# Patient Record
Sex: MALE | Race: WHITE | NOT HISPANIC OR LATINO | Employment: UNEMPLOYED | URBAN - METROPOLITAN AREA
[De-identification: names, ages, dates, MRNs, and addresses within clinical notes are randomized per-mention and may not be internally consistent; named-entity substitution may affect disease eponyms.]

---

## 2021-01-13 ENCOUNTER — OFFICE VISIT (OUTPATIENT)
Dept: FAMILY MEDICINE | Facility: CLINIC | Age: 21
End: 2021-01-13
Payer: COMMERCIAL

## 2021-01-13 VITALS
BODY MASS INDEX: 21.05 KG/M2 | WEIGHT: 164 LBS | SYSTOLIC BLOOD PRESSURE: 130 MMHG | HEIGHT: 74 IN | OXYGEN SATURATION: 99 % | TEMPERATURE: 96.9 F | DIASTOLIC BLOOD PRESSURE: 60 MMHG | RESPIRATION RATE: 20 BRPM | HEART RATE: 69 BPM

## 2021-01-13 DIAGNOSIS — F90.2 ATTENTION DEFICIT HYPERACTIVITY DISORDER (ADHD), COMBINED TYPE: ICD-10-CM

## 2021-01-13 DIAGNOSIS — Q67.6 PECTUS EXCAVATUM: ICD-10-CM

## 2021-01-13 DIAGNOSIS — Z00.00 ROUTINE GENERAL MEDICAL EXAMINATION AT A HEALTH CARE FACILITY: Primary | ICD-10-CM

## 2021-01-13 PROCEDURE — 90471 IMMUNIZATION ADMIN: CPT | Performed by: FAMILY MEDICINE

## 2021-01-13 PROCEDURE — 90715 TDAP VACCINE 7 YRS/> IM: CPT | Performed by: FAMILY MEDICINE

## 2021-01-13 PROCEDURE — 99385 PREV VISIT NEW AGE 18-39: CPT | Mod: 25 | Performed by: FAMILY MEDICINE

## 2021-01-13 PROCEDURE — 99213 OFFICE O/P EST LOW 20 MIN: CPT | Mod: 25 | Performed by: FAMILY MEDICINE

## 2021-01-13 RX ORDER — DEXTROAMPHETAMINE SACCHARATE, AMPHETAMINE ASPARTATE, DEXTROAMPHETAMINE SULFATE AND AMPHETAMINE SULFATE 3.75; 3.75; 3.75; 3.75 MG/1; MG/1; MG/1; MG/1
TABLET ORAL
COMMUNITY
Start: 2020-11-13 | End: 2021-01-13

## 2021-01-13 RX ORDER — DEXTROAMPHETAMINE SACCHARATE, AMPHETAMINE ASPARTATE, DEXTROAMPHETAMINE SULFATE AND AMPHETAMINE SULFATE 3.75; 3.75; 3.75; 3.75 MG/1; MG/1; MG/1; MG/1
TABLET ORAL
Qty: 45 TABLET | Refills: 0 | Status: SHIPPED | OUTPATIENT
Start: 2021-01-13 | End: 2021-03-01

## 2021-01-13 ASSESSMENT — PATIENT HEALTH QUESTIONNAIRE - PHQ9
SUM OF ALL RESPONSES TO PHQ QUESTIONS 1-9: 12
10. IF YOU CHECKED OFF ANY PROBLEMS, HOW DIFFICULT HAVE THESE PROBLEMS MADE IT FOR YOU TO DO YOUR WORK, TAKE CARE OF THINGS AT HOME, OR GET ALONG WITH OTHER PEOPLE: VERY DIFFICULT
SUM OF ALL RESPONSES TO PHQ QUESTIONS 1-9: 12

## 2021-01-13 ASSESSMENT — MIFFLIN-ST. JEOR: SCORE: 1823.65

## 2021-01-13 NOTE — PROGRESS NOTES
SUBJECTIVE:   CC: Keanu Vu is an 20 year old male who presents for preventative health visit.     Patient has been advised of split billing requirements and indicates understanding: Yes  Healthy Habits:     Getting at least 3 servings of Calcium per day:  NO    Bi-annual eye exam:  NO    Dental care twice a year:  Yes    Sleep apnea or symptoms of sleep apnea:  None    Diet:  Regular (no restrictions)    Frequency of exercise:  2-3 days/week    Duration of exercise:  15-30 minutes    Taking medications regularly:  Yes    Medication side effects:  Other    PHQ-2 Total Score: 4    Additional concerns today:  Yes    Pt requesting adderall refill - previously seen at [facility] signed. Pt would also like to discuss possible medications for depression/anxiety.     He has a medical history significant for ADHD.  He reports being on medication for this since third-fourth grade.  Most recently he has been on Adderall 15 mg in the morning and 7.5 mg in the afternoon.  This was being prescribed through a provider affiliated with [facility] where he is in [facility].  He did not enroll this past fall and has not been able to see this provider.  He is debating whether or not he is going to enroll this spring.  He has a few days left to decide.  He has been spacing out his Adderall use, but ran out 2 days ago.  He has increased hyperactivity as well as difficulty with focus, task completion and distractibility when he is not on the medication.  He has been working for [employer].  He denies significant unwanted side effects, but the Adderall does affect sleep and appetite.  He also has a history of pectus excavatum and scoliosis.  He reports seeing a specialist for this when he was in high school and they did not recommend any specific treatments.  He denies having pain or problems with these issues.    Answers for HPI/ROS submitted by the patient on 1/13/2021   Annual Exam:  If you checked off any  problems, how difficult have these problems made it for you to do your work, take care of things at home, or get along with other people?: Very difficult  PHQ9 TOTAL SCORE: 12              Today's PHQ-2 Score:   PHQ-2 ( 1999 [employer]) 1/13/2021   Q1: Little interest or pleasure in doing things 3   Q2: Feeling down, depressed or hopeless 1   PHQ-2 Score 4   Q1: Little interest or pleasure in doing things Nearly every day   Q2: Feeling down, depressed or hopeless Several days   PHQ-2 Score 4       Abuse: Current or Past(Physical, Sexual or Emotional)- No  Do you feel safe in your environment? Yes    Social History     Tobacco Use     Smoking status: Never Smoker     Smokeless tobacco: Current User   Substance Use Topics     Alcohol use: Not Currently     If you drink alcohol do you typically have >3 drinks per day or >7 drinks per week? No    Alcohol Use 1/13/2021   Prescreen: >3 drinks/day or >7 drinks/week? No   Prescreen: >3 drinks/day or >7 drinks/week? -   No flowsheet data found.    Last PSA: No results found for: PSA    Reviewed orders with patient. Reviewed health maintenance and updated orders accordingly - Yes      Reviewed and updated as needed this visit by clinical staff  Tobacco  Allergies  Meds   Med Hx  Surg Hx  Fam Hx  Soc Hx        Reviewed and updated as needed this visit by Provider                Past Surgical History:   Procedure Laterality Date     HC TOOTH EXTRACTION W/FORCEP         Review of Systems  CONSTITUTIONAL: NEGATIVE for fever, chills, change in weight  INTEGUMENTARY/SKIN: NEGATIVE for worrisome rashes, moles or lesions  EYES: NEGATIVE for vision changes or irritation  ENT: NEGATIVE for ear, mouth and throat problems  RESP: NEGATIVE for significant cough or SOB  CV: NEGATIVE for chest pain, palpitations or peripheral edema  GI: NEGATIVE for nausea, abdominal pain, heartburn, or change in bowel habits   male: negative for dysuria, hematuria, decreased urinary stream, erectile  "dysfunction, urethral discharge  MUSCULOSKELETAL: NEGATIVE for significant arthralgias or myalgia  NEURO: NEGATIVE for weakness, dizziness or paresthesias  PSYCHIATRIC: NEGATIVE for changes in mood or affect    OBJECTIVE:   /60 (Patient Position: Sitting, Cuff Size: Adult Regular)   Pulse 69   Temp 96.9  F (36.1  C) (Temporal)   Resp 20   Ht 1.88 m (6' 2\")   Wt 74.4 kg (164 lb)   SpO2 99%   BMI 21.06 kg/m      Physical Exam  GENERAL: healthy, alert and no distress  EYES: Eyes grossly normal to inspection, PERRL and conjunctivae and sclerae normal  HENT: ear canals and TM's normal, nose and mouth without ulcers or lesions  NECK: no adenopathy, no asymmetry, masses, or scars and thyroid normal to palpation  RESP: lungs clear to auscultation - no rales, rhonchi or wheezes  CV: regular rate and rhythm, normal S1 S2, no S3 or S4, no murmur, click or rub, no peripheral edema and peripheral pulses strong  ABDOMEN: soft, nontender, no hepatosplenomegaly, no masses and bowel sounds normal   (male): normal male genitalia without lesions or urethral discharge, no hernia  MS: no gross musculoskeletal defects noted, no edema  SKIN: no suspicious lesions or rashes  NEURO: Normal strength and tone, mentation intact and speech normal  PSYCH: mentation appears normal, affect normal/bright    Diagnostic Test Results:  Labs reviewed in Epic    ASSESSMENT/PLAN:   1. Routine general medical examination at a health care facility  I encouraged him to get regular exercise and eat a healthy diet.  He is not fasting today, therefore we decided not to check labs.  I offered STD screening labs which he declined today.  He was given a tetanus vaccination today.    2. Attention deficit hyperactivity disorder (ADHD), combined type  We are attempting to get records from his provider through the [facility] where he was being prescribed Adderall.  I went ahead and provided a 1 month refill while we are waiting for the " "records.  He has a long history of ADHD which has been controlled with Adderall without significant side effects.  He may continue to follow-up with me for this or with the previous provider.  I explained that he will need to pick 1 provider to follow him for refills.  - amphetamine-dextroamphetamine (ADDERALL) 15 MG tablet; take 1 TABLET by mouth EVERY MORNING AND 1/2 TABLET EVERY AFTERNOON  Dispense: 45 tablet; Refill: 0    3. Pectus excavatum  No work-up is needed for this as he is asymptomatic.      Patient has been advised of split billing requirements and indicates understanding: Yes  COUNSELING:   Reviewed preventive health counseling, as reflected in patient instructions       Regular exercise       Healthy diet/nutrition    Estimated body mass index is 21.06 kg/m  as calculated from the following:    Height as of this encounter: 1.88 m (6' 2\").    Weight as of this encounter: 74.4 kg (164 lb).         He reports that he has never smoked. He uses smokeless tobacco.      Counseling Resources:  ATP IV Guidelines  Pooled Cohorts Equation Calculator  FRAX Risk Assessment  ICSI Preventive Guidelines  Dietary Guidelines for Americans, 2010  USDA's MyPlate  ASA Prophylaxis  Lung CA Screening    Elio Alfaro, DO  [facility]  "

## 2021-03-01 DIAGNOSIS — F90.2 ATTENTION DEFICIT HYPERACTIVITY DISORDER (ADHD), COMBINED TYPE: ICD-10-CM

## 2021-03-01 RX ORDER — DEXTROAMPHETAMINE SACCHARATE, AMPHETAMINE ASPARTATE, DEXTROAMPHETAMINE SULFATE AND AMPHETAMINE SULFATE 3.75; 3.75; 3.75; 3.75 MG/1; MG/1; MG/1; MG/1
TABLET ORAL
Qty: 45 TABLET | Refills: 0 | Status: SHIPPED | OUTPATIENT
Start: 2021-03-01 | End: 2021-04-26

## 2021-03-01 NOTE — TELEPHONE ENCOUNTER
Reason for Call:  Other call back and prescription    Detailed comments: requesting a refill on his ADDERALL) 15 MG tablet; take 1 TABLET by mouth EVERY MORNING AND 1/2 TABLET EVERY AFTERNOON    [facility] - [place], [place] - [address] [place] [address] [address]  Please call and confirm    Phone Number Patient can be reached at: Home number on file [phone] (home)    Best Time: Today    Can we leave a detailed message on this number? YES    Call taken on 3/1/2021 at 11:10 AM by Mine Patel

## 2021-03-01 NOTE — TELEPHONE ENCOUNTER
Routing refill request to provider for review/approval because:  Drug not on the [facility] refill protocol   Claire DE LA FUENTE RN    Last Written Prescription Date:  1/13/2021  Last Fill Quantity: 45,  # refills: 0   Last office visit: 1/13/2021 with prescribing provider:     Future Office Visit:

## 2021-04-23 ENCOUNTER — TELEPHONE (OUTPATIENT)
Dept: FAMILY MEDICINE | Facility: CLINIC | Age: 21
End: 2021-04-23

## 2021-04-23 DIAGNOSIS — F90.2 ATTENTION DEFICIT HYPERACTIVITY DISORDER (ADHD), COMBINED TYPE: ICD-10-CM

## 2021-04-23 NOTE — TELEPHONE ENCOUNTER
Due for apt.  Scheduled pt in on Monday.  Tammy Gongora RN         Return in about 3 months (around 4/13/2021) for ADHD Follow Up

## 2021-04-23 NOTE — TELEPHONE ENCOUNTER
Reason for Call:  Medication or medication refill:Adderall  Do you use a [facility]?  Name of the pharmacy and phone number for the current request:     [facility] [id number] [address] [facility] - [place], [place] - [address] [address]   [facility] - [place], [place] - [address]      Name of the medication requested:     Other request:     Can we leave a detailed message on this number? YES    Phone number patient can be reached at: Home number on file [phone] (home)    Best Time: any    Call taken on 4/23/2021 at 10:11 AM by Gladis Sosa

## 2021-04-26 ENCOUNTER — OFFICE VISIT (OUTPATIENT)
Dept: FAMILY MEDICINE | Facility: CLINIC | Age: 21
End: 2021-04-26
Payer: COMMERCIAL

## 2021-04-26 VITALS
OXYGEN SATURATION: 97 % | RESPIRATION RATE: 20 BRPM | DIASTOLIC BLOOD PRESSURE: 78 MMHG | HEIGHT: 74 IN | HEART RATE: 80 BPM | BODY MASS INDEX: 21.98 KG/M2 | WEIGHT: 171.3 LBS | SYSTOLIC BLOOD PRESSURE: 125 MMHG | TEMPERATURE: 97.4 F

## 2021-04-26 DIAGNOSIS — F90.2 ATTENTION DEFICIT HYPERACTIVITY DISORDER (ADHD), COMBINED TYPE: Primary | ICD-10-CM

## 2021-04-26 PROCEDURE — 99213 OFFICE O/P EST LOW 20 MIN: CPT | Performed by: FAMILY MEDICINE

## 2021-04-26 RX ORDER — DEXTROAMPHETAMINE SACCHARATE, AMPHETAMINE ASPARTATE, DEXTROAMPHETAMINE SULFATE AND AMPHETAMINE SULFATE 3.75; 3.75; 3.75; 3.75 MG/1; MG/1; MG/1; MG/1
TABLET ORAL
Qty: 45 TABLET | Refills: 0 | Status: CANCELLED | OUTPATIENT
Start: 2021-04-26

## 2021-04-26 RX ORDER — DEXTROAMPHETAMINE SACCHARATE, AMPHETAMINE ASPARTATE, DEXTROAMPHETAMINE SULFATE AND AMPHETAMINE SULFATE 5; 5; 5; 5 MG/1; MG/1; MG/1; MG/1
TABLET ORAL
Qty: 45 TABLET | Refills: 0 | Status: SHIPPED | OUTPATIENT
Start: 2021-04-26 | End: 2022-01-17

## 2021-04-26 RX ORDER — DEXTROAMPHETAMINE SACCHARATE, AMPHETAMINE ASPARTATE, DEXTROAMPHETAMINE SULFATE AND AMPHETAMINE SULFATE 5; 5; 5; 5 MG/1; MG/1; MG/1; MG/1
TABLET ORAL
Qty: 45 TABLET | Refills: 0 | Status: SHIPPED | OUTPATIENT
Start: 2021-04-26 | End: 2021-09-03

## 2021-04-26 ASSESSMENT — MIFFLIN-ST. JEOR: SCORE: 1851.76

## 2021-04-26 NOTE — PROGRESS NOTES
Assessment & Plan     Attention deficit hyperactivity disorder (ADHD), combined type  The adderall continues to help with ADHD symptoms, although there is still room for improvement. We decided to increase the adderall dose to 20 mg in the AM and 10 mg in the afternoon.  - amphetamine-dextroamphetamine (ADDERALL) 20 MG tablet; Take 1 tablet (20 mg) in the morning and 1/2 tablet (10 mg) in the afternoon.  - amphetamine-dextroamphetamine (ADDERALL) 20 MG tablet; Take 1 tablet (20 mg) in the morning and 1/2 tablet (10 mg) in the afternoon.  - amphetamine-dextroamphetamine (ADDERALL) 20 MG tablet; Take 1 tablet (20 mg) in the morning and 1/2 tablet (10 mg) in the afternoon.                 Return in about 6 months (around 10/26/2021) for ADHD Follow Up.    Elio Alfaro [facility]   Keanu is a 21 year old who presents for the following health issues     HPI     He has a medical history significant for ADHD.  He reports being on medication for this since third-fourth grade.  Most recently he has been on Adderall 15 mg in the morning and 7.5 mg in the afternoon.  He has increased hyperactivity as well as difficulty with focus, task completion and distractibility when he is not on the medication. He reports that falling asleep can be difficult and his appetite is not always very good. He also feels like the current adderall dose does not seem to be as effective as it used to be.    Medication Followup of adderall 15 mg    Taking Medication as prescribed: yes    Side Effects:  None    Medication Helping Symptoms:  Yes, but pt would like to discuss      ADHD Follow-Up    Date of last ADHD office visit: 1/13/2021  Status since last visit: Stable  Taking controlled (daily) medications as prescribed: Yes      Parent/Patient Concerns with Medications: nothing new but pt would like to discuss medication in general   ADHD Medication     Amphetamines Disp Start End      "amphetamine-dextroamphetamine (ADDERALL) 15 MG tablet    45 tablet 3/1/2021     Sig: take 1 TABLET by mouth EVERY MORNING AND 1/2 TABLET EVERY AFTERNOON    Class: E-Prescribe    Earliest Fill Date: 3/1/2021              Review of Systems   Constitutional, HEENT, cardiovascular, pulmonary, gi and gu systems are negative, except as otherwise noted.      Objective    /78 (Patient Position: Sitting, Cuff Size: Adult Regular)   Pulse 80   Temp 97.4  F (36.3  C) (Tympanic)   Resp 20   Ht 1.88 m (6' 2\")   Wt 77.7 kg (171 lb 4.8 oz)   SpO2 97%   BMI 21.99 kg/m    Body mass index is 21.99 kg/m .  Physical Exam   GENERAL: healthy, alert and no distress  EYES: Eyes grossly normal to inspection, PERRL and conjunctivae and sclerae normal  NECK: no adenopathy, no asymmetry, masses, or scars and thyroid normal to palpation  RESP: lungs clear to auscultation - no rales, rhonchi or wheezes  CV: regular rate and rhythm, normal S1 S2, no S3 or S4, no murmur, click or rub, no peripheral edema and peripheral pulses strong  MS: no gross musculoskeletal defects noted, no edema  NEURO: Normal strength and tone, mentation intact and speech normal  PSYCH: mentation appears normal, affect normal/bright                "

## 2021-09-03 ENCOUNTER — TELEPHONE (OUTPATIENT)
Dept: PSYCHIATRY | Facility: CLINIC | Age: 21
End: 2021-09-03

## 2021-09-03 ENCOUNTER — MYC REFILL (OUTPATIENT)
Dept: FAMILY MEDICINE | Facility: CLINIC | Age: 21
End: 2021-09-03

## 2021-09-03 ENCOUNTER — MYC MEDICAL ADVICE (OUTPATIENT)
Dept: FAMILY MEDICINE | Facility: CLINIC | Age: 21
End: 2021-09-03

## 2021-09-03 DIAGNOSIS — F90.2 ATTENTION DEFICIT HYPERACTIVITY DISORDER (ADHD), COMBINED TYPE: ICD-10-CM

## 2021-09-03 DIAGNOSIS — Z11.4 SCREENING FOR HIV (HUMAN IMMUNODEFICIENCY VIRUS): ICD-10-CM

## 2021-09-03 DIAGNOSIS — F41.9 ANXIETY: Primary | ICD-10-CM

## 2021-09-03 DIAGNOSIS — Z11.59 ENCOUNTER FOR HEPATITIS C SCREENING TEST FOR LOW RISK PATIENT: ICD-10-CM

## 2021-09-03 NOTE — TELEPHONE ENCOUNTER
PSYCHIATRY CLINIC PHONE INTAKE     SERVICES REQUESTED / INTERESTED IN          Med Management    Presenting Problem and Brief History                              What would you like to be seen for? (brief description):  Pt was diagnosed in 3rd grade. He is attending the [employer] [employer] in the fall and looking for med management for his ADHD. He states he feels miserable throughout the day. Pt is having issues with sleep more than appetite. He takes adderall 15mg IR in the morning and another 7.5mg in the afternoon.   Have you received a mental health diagnosis? Yes   Which one (s): ADHD  Is there any history of developmental delay?  No   Are you currently seeing a mental health provider?  No            Who / month last seen:  [facility]  Do you have mental health records elsewhere?  Yes  Will you sign a release so we can obtain them?  Yes    Have you ever been hospitalized for psychiatric reasons?  No  Describe:  NA    Do you have current thoughts of self-harm?  No    Do you currently have thoughts of harming others?  No       Substance Use History     Do you have any history of alcohol / illicit drug use?  No  Describe:  NA  Have you ever received treatment for this?  No    Describe:  NA     Social History     Who is the patient's a guardian?  No    Name / number: NA  Have you had an ACT team in last 12 months?  No  Describe: No   OK to leave a detailed voicemail?  Yes    Would you be interested in learning more about research opportunities for which you or your child may qualify? We can connect you with a team member for more information.  Yes  If yes, send an [employer]) message to Lashanda Robert    Medical/ Surgical History                                   Patient Active Problem List   Diagnosis     Attention deficit hyperactivity disorder (ADHD), combined type     Pectus excavatum          Medications             Current Outpatient Medications   Medication Sig Dispense Refill     amphetamine-dextroamphetamine (ADDERALL) 20 MG  tablet Take 1 tablet (20 mg) in the morning and 1/2 tablet (10 mg) in the afternoon. 45 tablet 0     amphetamine-dextroamphetamine (ADDERALL) 20 MG tablet Take 1 tablet (20 mg) in the morning and 1/2 tablet (10 mg) in the afternoon. 45 tablet 0     amphetamine-dextroamphetamine (ADDERALL) 20 MG tablet Take 1 tablet (20 mg) in the morning and 1/2 tablet (10 mg) in the afternoon. 45 tablet 0         DISPOSITION      9/3/21 Intake complete. Scheduled for [facility] on 11/22 w/ .     Ammy Cr,

## 2021-09-07 RX ORDER — DEXTROAMPHETAMINE SACCHARATE, AMPHETAMINE ASPARTATE, DEXTROAMPHETAMINE SULFATE AND AMPHETAMINE SULFATE 5; 5; 5; 5 MG/1; MG/1; MG/1; MG/1
TABLET ORAL
Qty: 45 TABLET | Refills: 0 | Status: SHIPPED | OUTPATIENT
Start: 2021-09-07 | End: 2022-01-17

## 2021-09-07 NOTE — TELEPHONE ENCOUNTER
DE,    Refill request for Adderall  Last OV 4/26/21    Routing refill request to provider for review/approval because:  Drug not on the [facility] refill protocol     Thanks,  Poornima Gomez, RN

## 2021-09-18 ENCOUNTER — HEALTH MAINTENANCE LETTER (OUTPATIENT)
Age: 21
End: 2021-09-18

## 2021-11-22 ENCOUNTER — TELEPHONE (OUTPATIENT)
Dept: PSYCHIATRY | Facility: CLINIC | Age: 21
End: 2021-11-22
Payer: COMMERCIAL

## 2021-11-22 NOTE — TELEPHONE ENCOUNTER
On November 22, 2021, at 9:22 AM, writer called patient at mobile to confirm Virtual Visit. Writer unable to make contact with patient. Unable to leave dvm due to voice mailbox being full. Link for Rahul was sent to preferred e-mail: [email or web address]. Maggy Yo, EMT    On November 22, 2021, at 9:44 AM, writer called patient at mobile to confirm Virtual Visit. Writer unable to make contact with patient.  Jerad Montemayor, EMT

## 2022-01-13 DIAGNOSIS — F90.2 ATTENTION DEFICIT HYPERACTIVITY DISORDER (ADHD), COMBINED TYPE: ICD-10-CM

## 2022-01-13 RX ORDER — DEXTROAMPHETAMINE SACCHARATE, AMPHETAMINE ASPARTATE, DEXTROAMPHETAMINE SULFATE AND AMPHETAMINE SULFATE 5; 5; 5; 5 MG/1; MG/1; MG/1; MG/1
TABLET ORAL
Qty: 45 TABLET | Refills: 0 | Status: CANCELLED | OUTPATIENT
Start: 2022-01-13

## 2022-01-17 ENCOUNTER — VIRTUAL VISIT (OUTPATIENT)
Dept: FAMILY MEDICINE | Facility: CLINIC | Age: 22
End: 2022-01-17
Payer: COMMERCIAL

## 2022-01-17 VITALS — WEIGHT: 170 LBS | BODY MASS INDEX: 21.83 KG/M2

## 2022-01-17 DIAGNOSIS — F90.2 ATTENTION DEFICIT HYPERACTIVITY DISORDER (ADHD), COMBINED TYPE: Primary | ICD-10-CM

## 2022-01-17 DIAGNOSIS — F41.9 ANXIETY: ICD-10-CM

## 2022-01-17 PROCEDURE — 99214 OFFICE O/P EST MOD 30 MIN: CPT | Mod: GT | Performed by: FAMILY MEDICINE

## 2022-01-17 RX ORDER — DEXTROAMPHETAMINE SACCHARATE, AMPHETAMINE ASPARTATE, DEXTROAMPHETAMINE SULFATE AND AMPHETAMINE SULFATE 5; 5; 5; 5 MG/1; MG/1; MG/1; MG/1
TABLET ORAL
Qty: 45 TABLET | Refills: 0 | Status: SHIPPED | OUTPATIENT
Start: 2022-02-17

## 2022-01-17 RX ORDER — SERTRALINE HYDROCHLORIDE 25 MG/1
25 TABLET, FILM COATED ORAL DAILY
Qty: 30 TABLET | Refills: 1 | Status: SHIPPED | OUTPATIENT
Start: 2022-01-17 | End: 2022-02-21

## 2022-01-17 RX ORDER — DEXTROAMPHETAMINE SACCHARATE, AMPHETAMINE ASPARTATE, DEXTROAMPHETAMINE SULFATE AND AMPHETAMINE SULFATE 5; 5; 5; 5 MG/1; MG/1; MG/1; MG/1
TABLET ORAL
Qty: 45 TABLET | Refills: 0 | Status: SHIPPED | OUTPATIENT
Start: 2022-01-17

## 2022-01-17 RX ORDER — DEXTROAMPHETAMINE SACCHARATE, AMPHETAMINE ASPARTATE, DEXTROAMPHETAMINE SULFATE AND AMPHETAMINE SULFATE 5; 5; 5; 5 MG/1; MG/1; MG/1; MG/1
TABLET ORAL
Qty: 45 TABLET | Refills: 0 | Status: SHIPPED | OUTPATIENT
Start: 2022-03-17 | End: 2022-04-19

## 2022-01-17 ASSESSMENT — PATIENT HEALTH QUESTIONNAIRE - PHQ9
SUM OF ALL RESPONSES TO PHQ QUESTIONS 1-9: 6
10. IF YOU CHECKED OFF ANY PROBLEMS, HOW DIFFICULT HAVE THESE PROBLEMS MADE IT FOR YOU TO DO YOUR WORK, TAKE CARE OF THINGS AT HOME, OR GET ALONG WITH OTHER PEOPLE: SOMEWHAT DIFFICULT
SUM OF ALL RESPONSES TO PHQ QUESTIONS 1-9: 6

## 2022-01-17 NOTE — TELEPHONE ENCOUNTER
Routing refill request to provider for review/approval because:  Drug not on the [facility] refill protocol   Claire DE LA FUENTE RN

## 2022-01-17 NOTE — NURSING NOTE
"Chief Complaint   Patient presents with     A.D.H.D     initial Wt 77.1 kg (170 lb)   BMI 21.83 kg/m   Estimated body mass index is 21.83 kg/m  as calculated from the following:    Height as of 4/26/21: 1.88 m (6' 2\").    Weight as of this encounter: 77.1 kg (170 lb).  BP completed using cuff size: .  L  R arm      Health Maintenance that is potentially due pending provider review:      [place] ma  "

## 2022-01-17 NOTE — PROGRESS NOTES
Keanu is a 21 year old who is being evaluated via a billable video visit.      How would you like to obtain your AVS? MyChart  If the video visit is dropped, the invitation should be resent by:   Will anyone else be joining your video visit? No      Video Start Time: 11:42 AM    Assessment & Plan     Attention deficit hyperactivity disorder (ADHD), combined type  ADHD symptoms appear to be well controlled with Adderall without unwanted side effects.  He may continue to get refills of the neck 6 months when he will be due for his next follow-up appointment.  - amphetamine-dextroamphetamine (ADDERALL) 20 MG tablet; Take 1 tablet (20 mg) in the morning and 1/2 tablet (10 mg) in the afternoon.  - amphetamine-dextroamphetamine (ADDERALL) 20 MG tablet; Take 1 tablet (20 mg) in the morning and 1/2 tablet (10 mg) in the afternoon.  - amphetamine-dextroamphetamine (ADDERALL) 20 MG tablet; Take 1 tablet (20 mg) in the morning and 1/2 tablet (10 mg) in the afternoon.    Anxiety  We discussed worsening symptoms of anxiety and decided to start a low-dose of sertraline.  I recommended he schedule follow-up with me in 3-4 weeks or sooner if needed.  - sertraline (ZOLOFT) 25 MG tablet; Take 1 tablet (25 mg) by mouth daily                 Return in about 4 weeks (around 2/14/2022) for Follow up mental health.    Elio Alfaro, [facility]   Keanu is a 21 year old who presents for the following health issues     HPI     ADHD Follow-Up    Date of last ADHD office visit: 04/26/2021  Status since last visit: Stable  Taking controlled (daily) medications as prescribed: Yes                       Parent/Patient Concerns with Medications: None  ADHD Medication     Amphetamines Disp Start End     amphetamine-dextroamphetamine (ADDERALL) 20 MG tablet    45 tablet 9/7/2021     Sig: Take 1 tablet (20 mg) in the morning and 1/2 tablet (10 mg) in the afternoon.    Class: E-Prescribe    Earliest Fill  Date: 9/7/2021     amphetamine-dextroamphetamine (ADDERALL) 20 MG tablet    45 tablet 4/26/2021     Sig: Take 1 tablet (20 mg) in the morning and 1/2 tablet (10 mg) in the afternoon.    Class: E-Prescribe    Earliest Fill Date: 4/26/2021     amphetamine-dextroamphetamine (ADDERALL) 20 MG tablet    45 tablet 4/26/2021     Sig: Take 1 tablet (20 mg) in the morning and 1/2 tablet (10 mg) in the afternoon.    Class: E-Prescribe    Earliest Fill Date: 4/26/2021            He has a history significant for ADHD combined type and anxiety.  He feels like ADHD symptoms are well controlled with Adderall 20 mg in the morning and 10 mg as needed in the afternoon.  He denies unwanted side effects.  Appetite and sleep have been good.  He has continued to struggle with anxiety symptoms.  He also reports having some concerns around addiction to pornography.  He is in the process of seeking out help for this.  He is wondering if there are any medications that may be helpful for anxiety/depression symptoms.  He remembers taking Wellbutrin when he was a sophomore in college, but he did not stick with it.  He reports not being ready to work on changing his behaviors at that time.    Review of Systems   Constitutional, HEENT, cardiovascular, pulmonary, GI, , musculoskeletal, neuro, skin, endocrine and psych systems are negative, except as otherwise noted.      Objective       Vitals:  No vitals were obtained today due to virtual visit.    Physical Exam   GENERAL: Healthy, alert and no distress  EYES: Eyes grossly normal to inspection.  No discharge or erythema, or obvious scleral/conjunctival abnormalities.  RESP: No audible wheeze, cough, or visible cyanosis.  No visible retractions or increased work of breathing.    SKIN: Visible skin clear. No significant rash, abnormal pigmentation or lesions.  NEURO: Cranial nerves grossly intact.  Mentation and speech appropriate for age.  PSYCH: Mentation appears normal, affect normal/bright,  judgement and insight intact, normal speech and appearance well-groomed.                Video-Visit Details    Type of service:  Video Visit    Video End Time:12:05 PM    Originating Location (pt. Location): Home    Distant Location (provider location):  [facility]     Platform used for Video Visit: [employer]  Answers for HPI/ROS submitted by the patient on 1/17/2022  If you checked off any problems, how difficult have these problems made it for you to do your work, take care of things at home, or get along with other people?: Somewhat difficult  PHQ9 TOTAL SCORE: 6

## 2022-01-18 ASSESSMENT — PATIENT HEALTH QUESTIONNAIRE - PHQ9: SUM OF ALL RESPONSES TO PHQ QUESTIONS 1-9: 6

## 2022-02-21 ENCOUNTER — VIRTUAL VISIT (OUTPATIENT)
Dept: FAMILY MEDICINE | Facility: CLINIC | Age: 22
End: 2022-02-21
Payer: COMMERCIAL

## 2022-02-21 DIAGNOSIS — F90.2 ATTENTION DEFICIT HYPERACTIVITY DISORDER (ADHD), COMBINED TYPE: ICD-10-CM

## 2022-02-21 DIAGNOSIS — F41.9 ANXIETY: Primary | ICD-10-CM

## 2022-02-21 PROCEDURE — 99214 OFFICE O/P EST MOD 30 MIN: CPT | Mod: GT | Performed by: FAMILY MEDICINE

## 2022-02-21 ASSESSMENT — ANXIETY QUESTIONNAIRES
7. FEELING AFRAID AS IF SOMETHING AWFUL MIGHT HAPPEN: SEVERAL DAYS
2. NOT BEING ABLE TO STOP OR CONTROL WORRYING: NOT AT ALL
6. BECOMING EASILY ANNOYED OR IRRITABLE: NOT AT ALL
5. BEING SO RESTLESS THAT IT IS HARD TO SIT STILL: SEVERAL DAYS
GAD7 TOTAL SCORE: 4
GAD7 TOTAL SCORE: 4
4. TROUBLE RELAXING: SEVERAL DAYS
GAD7 TOTAL SCORE: 4
1. FEELING NERVOUS, ANXIOUS, OR ON EDGE: SEVERAL DAYS
3. WORRYING TOO MUCH ABOUT DIFFERENT THINGS: NOT AT ALL
7. FEELING AFRAID AS IF SOMETHING AWFUL MIGHT HAPPEN: SEVERAL DAYS

## 2022-02-21 ASSESSMENT — PATIENT HEALTH QUESTIONNAIRE - PHQ9
SUM OF ALL RESPONSES TO PHQ QUESTIONS 1-9: 5
SUM OF ALL RESPONSES TO PHQ QUESTIONS 1-9: 5
10. IF YOU CHECKED OFF ANY PROBLEMS, HOW DIFFICULT HAVE THESE PROBLEMS MADE IT FOR YOU TO DO YOUR WORK, TAKE CARE OF THINGS AT HOME, OR GET ALONG WITH OTHER PEOPLE: SOMEWHAT DIFFICULT

## 2022-02-21 NOTE — PROGRESS NOTES
Keanu is a 22 year old who is being evaluated via a billable video visit.      How would you like to obtain your AVS? MyChart  If the video visit is dropped, the invitation should be resent by: Text to cell phone: [phone]  Will anyone else be joining your video visit? No      Video Start Time: 5:19 PM    Assessment & Plan     Anxiety  We discussed treatment options and decided to increase the sertraline to 50 mg daily since there is still room for improvement.  He is going to follow-up with me in 2 months or sooner if needed.  - sertraline (ZOLOFT) 50 MG tablet; Take 1 tablet (50 mg) by mouth daily    Attention deficit hyperactivity disorder (ADHD), combined type  The Adderall continues to work well for ADHD symptoms.  He is still having some difficulty with sleep at times.  We discussed strategies that can be helpful for this.  He will be following up with me in 2 months or sooner if needed.                   Return in about 2 months (around 4/21/2022) for ADHD Follow Up.    Elio Alfaro, [facility]   Keanu is a 22 year old who presents for the following health issues     History of Present Illness       Mental Health Follow-up:  Patient presents to follow-up on Depression & Anxiety.Patient's depression since last visit has been:  Better  The patient is not having other symptoms associated with depression.  Patient's anxiety since last visit has been:  Better  The patient is not having other symptoms associated with anxiety.  Any significant life events: No  Patient is not feeling anxious or having panic attacks.  Patient has concerns about alcohol or drug use.     Social History  Tobacco Use    Smoking status: Never Smoker    Smokeless tobacco: Current User  Alcohol use: Not Currently  Drug use: Yes    Types: Marijuana      Today's PHQ-9         PHQ-9 Total Score:     (P) 5   PHQ-9 Q9 Thoughts of better off dead/self-harm past 2 weeks :   (P) Not at all    Thoughts of suicide or self harm:      Self-harm Plan:        Self-harm Action:          Safety concerns for self or others:           He eats 0-1 servings of fruits and vegetables daily.He consumes 1 sweetened beverage(s) daily.He exercises with enough effort to increase his heart rate 30 to 60 minutes per day.  He exercises with enough effort to increase his heart rate 5 days per week. He is missing 2 dose(s) of medications per week.  He is not taking prescribed medications regularly due to side effects and remembering to take.   Answers for HPI/ROS submitted by the patient on 2/21/2022  If you checked off any problems, how difficult have these problems made it for you to do your work, take care of things at home, or get along with other people?: Somewhat difficult  PHQ9 TOTAL SCORE: 5  [place] 7 TOTAL SCORE: 4        ADHD Follow-Up    Date of last ADHD office visit: 04/26/2021  Status since last visit: Improving  Taking controlled (daily) medications as prescribed: Yes                       Parent/Patient Concerns with Medications: He occasionally has difficulty with sleep  ADHD Medication     Amphetamines Disp Start End     amphetamine-dextroamphetamine (ADDERALL) 20 MG tablet    45 tablet 3/17/2022     Sig: Take 1 tablet (20 mg) in the morning and 1/2 tablet (10 mg) in the afternoon.    Class: E-Prescribe    Earliest Fill Date: 3/17/2022     amphetamine-dextroamphetamine (ADDERALL) 20 MG tablet    45 tablet 2/17/2022     Sig: Take 1 tablet (20 mg) in the morning and 1/2 tablet (10 mg) in the afternoon.    Class: E-Prescribe    Earliest Fill Date: 2/17/2022     amphetamine-dextroamphetamine (ADDERALL) 20 MG tablet    45 tablet 1/17/2022     Sig: Take 1 tablet (20 mg) in the morning and 1/2 tablet (10 mg) in the afternoon.    Class: E-Prescribe    Earliest Fill Date: 1/17/2022              Last month he was started on sertraline 25 mg daily to help with anxiety symptoms.  He feels like this medication has been  quite helpful, but there is still some room for improvement.    Review of Systems   Constitutional, HEENT, cardiovascular, pulmonary, gi and gu systems are negative, except as otherwise noted.      Objective         Vitals:  No vitals were obtained today due to virtual visit.    Physical Exam   GENERAL: Healthy, alert and no distress  EYES: Eyes grossly normal to inspection.  No discharge or erythema, or obvious scleral/conjunctival abnormalities.  RESP: No audible wheeze, cough, or visible cyanosis.  No visible retractions or increased work of breathing.    SKIN: Visible skin clear. No significant rash, abnormal pigmentation or lesions.  NEURO: Cranial nerves grossly intact.  Mentation and speech appropriate for age.  PSYCH: Mentation appears normal, affect normal/bright, judgement and insight intact, normal speech and appearance well-groomed.                Video-Visit Details    Type of service:  Video Visit    Video End Time:5:26 PM    Originating Location (pt. Location): Home    Distant Location (provider location):  [facility]     Platform used for Video Visit: [employer]

## 2022-02-22 ASSESSMENT — ANXIETY QUESTIONNAIRES: GAD7 TOTAL SCORE: 4

## 2022-03-05 ENCOUNTER — HEALTH MAINTENANCE LETTER (OUTPATIENT)
Age: 22
End: 2022-03-05

## 2022-04-04 ENCOUNTER — TELEPHONE (OUTPATIENT)
Dept: FAMILY MEDICINE | Facility: CLINIC | Age: 22
End: 2022-04-04
Payer: COMMERCIAL

## 2022-04-04 NOTE — PROGRESS NOTES
Keanu is a 22 year old who is being evaluated via a billable video visit.      How would you like to obtain your AVS? MyChart  If the video visit is dropped, the invitation should be resent by: Text to cell phone: [phone]  Will anyone else be joining your video visit? No      Video Start Time: 10:25 AM    Assessment & Plan     Anxiety  Anxiety symptoms have improved since increasing the sertraline to 50 mg daily a couple of months ago.  There is still room for improvement and we decided to increase it to 100 mg daily.  He will continue to monitor symptoms closely and let me know if he is not tolerating this.  - sertraline (ZOLOFT) 100 MG tablet; Take 1 tablet (100 mg) by mouth daily    Attention deficit hyperactivity disorder (ADHD), combined type  Symptoms continue to be well controlled on Adderall without unwanted side effects.    Pectus excavatum  He has mild pectus excavatum.  I tried to reassure him that this issue does not appear severe enough to cause respiratory problems.  It is possible that it could be contributing to rib strain symptoms he has experienced in the past few months.  He is going to simply monitor this for now and let me know if he has further concerns in the future.    Rib pain/strain  He describes having a few episodes of rib strain symptoms over the past couple of months.  Symptoms typically last 4-7 days before they resolve on their own.  He may take Tylenol or ibuprofen as needed when this happens.  He may use ice or heat as needed and stretch.    Localized swelling, mass and lump, neck  He describes having a long history of a well-circumscribed bump in the left posterior neck/head region over the past several years.  It is not inflamed or painful.  I explained that the symptoms are probably due to either a noninflamed sebaceous cyst, lymph node or lipoma.  We are planning to reevaluate this the next time he is in clinic.    Screening cholesterol level  - Lipid Profile (Chol, Trig,  "HDL, LDL calc); Future                 Return in about 3 months (around 7/6/2022) for Annual Exam.    Elio Alfaro [facility]    Yarelis Colunga is a 22 year old who presents for the following health issues     HPI               He has a history of mild pectus excavatum and reports that this is never caused any significant problems for him in the past.  He played sports growing up and did not have any significant breathing or chest problems from it.  Over the past couple months he has had a few \"rib flares\" that cause discomfort in the lower ribs, worse on the right.  He thinks sleeping in an abnormal position triggered the symptoms.  He denies any specific injury that he can think of that caused the symptoms.  He reports feeling fine now.  But a week ago he describes having an episode of difficulty taking deep breaths with some chest discomfort.  Symptoms eventually resolved on their own and he feels fine now.    He has a history of anxiety and was recently started on sertraline.  A couple months ago we increased the dose to 50 mg daily.  He is tolerating this well and has noticed improvement in anxiety symptoms.  He feels like there is still room for improvement and he is feeling a little flat.  He also has a history of ADHD and takes Adderall.  He denies unwanted side effects from this.  Overall, he feels like his mental health is very stable without a lot of ups and downs.  He reports that there are good and bad things about this.    He describes feeling a small bump in the back of his neck on the left side.  He reports it has been there for several years.  It is not painful.  It does not seem inflamed.  There has not been any drainage.  He cannot remember if he is to have a pimple or cyst in this area when it first developed several years ago.  It does not seem to be getting bigger or smaller.    Review of Systems   Constitutional, HEENT, cardiovascular, pulmonary, gi and " gu systems are negative, except as otherwise noted.      Objective           Vitals:  No vitals were obtained today due to virtual visit.    Physical Exam   GENERAL: Healthy, alert and no distress  EYES: Eyes grossly normal to inspection.  No discharge or erythema, or obvious scleral/conjunctival abnormalities.  RESP: No audible wheeze, cough, or visible cyanosis.  No visible retractions or increased work of breathing.    SKIN: Visible skin clear. No significant rash, abnormal pigmentation or lesions.  NEURO: Cranial nerves grossly intact.  Mentation and speech appropriate for age.  PSYCH: Affect is mildly anxious, judgement and insight intact, normal speech and appearance well-groomed.                Video-Visit Details    Type of service:  Video Visit    Video End Time:10:54 AM    Originating Location (pt. Location): Home    Distant Location (provider location):  [facility]     Platform used for Video Visit: [employer]

## 2022-04-04 NOTE — TELEPHONE ENCOUNTER
"DE,    Patient called.  States he has Pectus Excavation     States he has been exercising more and concerned he doesn't have enough room in his chest for all his organs    Has woke up twice in the past feeling like he can't breath and feels like his heart rate is beating fast  \"First time this happened I felt like I was dehydrated so I drank a lot of water and felt better\"  \"I've also had some rib flares, feels like something is poking in my chest\"    Denies any of these feelings/symptoms at this time    Reports he thinks he has had a swollen lymph node back of his neck - upper left towards hairline for 2-3 years.  Feels this should be checked out.    Patient sounds very anxious. Jumping from ome subject to the next..  He would like to see you.   The only spot you have this week is Wednesday 4/6 at 10:20 am - \"provider access only\"    Can we schedule him to see you then?  Poornima Gomez RN          "

## 2022-04-04 NOTE — TELEPHONE ENCOUNTER
Scheduled patient to see DE Wednesday 4/6 at 10:00 am.  Called and informed patient.  Poornima Gomez RN

## 2022-04-06 ENCOUNTER — VIRTUAL VISIT (OUTPATIENT)
Dept: FAMILY MEDICINE | Facility: CLINIC | Age: 22
End: 2022-04-06
Payer: COMMERCIAL

## 2022-04-06 DIAGNOSIS — F41.9 ANXIETY: Primary | ICD-10-CM

## 2022-04-06 DIAGNOSIS — F90.2 ATTENTION DEFICIT HYPERACTIVITY DISORDER (ADHD), COMBINED TYPE: ICD-10-CM

## 2022-04-06 DIAGNOSIS — R22.1 LOCALIZED SWELLING, MASS AND LUMP, NECK: ICD-10-CM

## 2022-04-06 DIAGNOSIS — Z13.220 SCREENING CHOLESTEROL LEVEL: ICD-10-CM

## 2022-04-06 DIAGNOSIS — R07.81 RIB PAIN: ICD-10-CM

## 2022-04-06 DIAGNOSIS — Q67.6 PECTUS EXCAVATUM: ICD-10-CM

## 2022-04-06 PROCEDURE — 99214 OFFICE O/P EST MOD 30 MIN: CPT | Mod: GT | Performed by: FAMILY MEDICINE

## 2022-04-06 RX ORDER — SERTRALINE HYDROCHLORIDE 100 MG/1
100 TABLET, FILM COATED ORAL DAILY
Qty: 90 TABLET | Refills: 1 | Status: SHIPPED | OUTPATIENT
Start: 2022-04-06

## 2022-04-19 DIAGNOSIS — F90.2 ATTENTION DEFICIT HYPERACTIVITY DISORDER (ADHD), COMBINED TYPE: ICD-10-CM

## 2022-04-19 RX ORDER — DEXTROAMPHETAMINE SACCHARATE, AMPHETAMINE ASPARTATE, DEXTROAMPHETAMINE SULFATE AND AMPHETAMINE SULFATE 5; 5; 5; 5 MG/1; MG/1; MG/1; MG/1
TABLET ORAL
Qty: 45 TABLET | Refills: 0 | Status: SHIPPED | OUTPATIENT
Start: 2022-04-19

## 2022-11-19 ENCOUNTER — HEALTH MAINTENANCE LETTER (OUTPATIENT)
Age: 22
End: 2022-11-19

## 2023-04-09 ENCOUNTER — HEALTH MAINTENANCE LETTER (OUTPATIENT)
Age: 23
End: 2023-04-09

## 2024-06-16 ENCOUNTER — HEALTH MAINTENANCE LETTER (OUTPATIENT)
Age: 24
End: 2024-06-16

## 2025-08-23 ENCOUNTER — HEALTH MAINTENANCE LETTER (OUTPATIENT)
Age: 25
End: 2025-08-23